# Patient Record
Sex: FEMALE
[De-identification: names, ages, dates, MRNs, and addresses within clinical notes are randomized per-mention and may not be internally consistent; named-entity substitution may affect disease eponyms.]

---

## 2023-03-21 LAB
ANTIBODY SCREEN: NORMAL
ERYTHROCYTE DISTRIBUTION WIDTH (RATIO) BY AUTOMATED COUNT: 14.7 % (ref 11.5–14.5)
ERYTHROCYTE MEAN CORPUSCULAR HEMOGLOBIN CONCENTRATION (G/DL) BY AUTOMATED: 31.4 G/DL (ref 32–36)
ERYTHROCYTE MEAN CORPUSCULAR VOLUME (FL) BY AUTOMATED COUNT: 85 FL (ref 80–100)
ERYTHROCYTES (10*6/UL) IN BLOOD BY AUTOMATED COUNT: 4.8 X10E12/L (ref 4–5.2)
HEMATOCRIT (%) IN BLOOD BY AUTOMATED COUNT: 40.7 % (ref 36–46)
HEMOGLOBIN (G/DL) IN BLOOD: 12.8 G/DL (ref 12–16)
LEUKOCYTES (10*3/UL) IN BLOOD BY AUTOMATED COUNT: 13.9 X10E9/L (ref 4.4–11.3)
NRBC (PER 100 WBCS) BY AUTOMATED COUNT: 0 /100 WBC (ref 0–0)
PLATELETS (10*3/UL) IN BLOOD AUTOMATED COUNT: 348 X10E9/L (ref 150–450)
REFLEX ADDED, ANEMIA PANEL: ABNORMAL
SYPHILIS TOTAL AB: NONREACTIVE

## 2023-03-22 LAB
CHLAMYDIA TRACH., AMPLIFIED: NEGATIVE
CLUE CELLS: NORMAL
N. GONORRHEA, AMPLIFIED: NEGATIVE
NUGENT SCORE: 1
YEAST: NORMAL

## 2023-04-21 LAB
ABO GROUP (TYPE) IN BLOOD: NORMAL
ALANINE AMINOTRANSFERASE (SGPT) (U/L) IN SER/PLAS: 12 U/L (ref 7–45)
ALBUMIN (G/DL) IN SER/PLAS: 3.4 G/DL (ref 3.4–5)
ALKALINE PHOSPHATASE (U/L) IN SER/PLAS: 92 U/L (ref 33–110)
ANION GAP IN SER/PLAS: 15 MMOL/L (ref 10–20)
ANTIBODY SCREEN: NORMAL
ASPARTATE AMINOTRANSFERASE (SGOT) (U/L) IN SER/PLAS: 14 U/L (ref 9–39)
BILIRUBIN TOTAL (MG/DL) IN SER/PLAS: 0.2 MG/DL (ref 0–1.2)
CALCIUM (MG/DL) IN SER/PLAS: 9.5 MG/DL (ref 8.6–10.6)
CARBON DIOXIDE, TOTAL (MMOL/L) IN SER/PLAS: 21 MMOL/L (ref 21–32)
CHLORIDE (MMOL/L) IN SER/PLAS: 106 MMOL/L (ref 98–107)
CREATININE (MG/DL) IN SER/PLAS: 0.68 MG/DL (ref 0.5–1.05)
ERYTHROCYTE DISTRIBUTION WIDTH (RATIO) BY AUTOMATED COUNT: 14.5 % (ref 11.5–14.5)
ERYTHROCYTE MEAN CORPUSCULAR HEMOGLOBIN CONCENTRATION (G/DL) BY AUTOMATED: 32.1 G/DL (ref 32–36)
ERYTHROCYTE MEAN CORPUSCULAR VOLUME (FL) BY AUTOMATED COUNT: 85 FL (ref 80–100)
ERYTHROCYTES (10*6/UL) IN BLOOD BY AUTOMATED COUNT: 4.21 X10E12/L (ref 4–5.2)
GFR FEMALE: >90 ML/MIN/1.73M2
GLUCOSE (MG/DL) IN SER/PLAS: 119 MG/DL (ref 74–99)
GLUCOSE, 1 HR SCREEN, PREG: 113 MG/DL
HEMATOCRIT (%) IN BLOOD BY AUTOMATED COUNT: 35.8 % (ref 36–46)
HEMOGLOBIN (G/DL) IN BLOOD: 11.5 G/DL (ref 12–16)
HEPATITIS B VIRUS SURFACE AG PRESENCE IN SERUM: NONREACTIVE
HEPATITIS C VIRUS AB PRESENCE IN SERUM: NONREACTIVE
HIV 1/ 2 AG/AB SCREEN: NONREACTIVE
LACTATE DEHYDROGENASE (U/L) IN SER/PLAS BY LAC->PYR RXN: 156 U/L (ref 84–246)
LEUKOCYTES (10*3/UL) IN BLOOD BY AUTOMATED COUNT: 12.5 X10E9/L (ref 4.4–11.3)
NRBC (PER 100 WBCS) BY AUTOMATED COUNT: 0 /100 WBC (ref 0–0)
PLATELETS (10*3/UL) IN BLOOD AUTOMATED COUNT: 339 X10E9/L (ref 150–450)
POTASSIUM (MMOL/L) IN SER/PLAS: 3.9 MMOL/L (ref 3.5–5.3)
PROTEIN TOTAL: 6.3 G/DL (ref 6.4–8.2)
REFLEX ADDED, ANEMIA PANEL: ABNORMAL
RH FACTOR: NORMAL
RUBELLA VIRUS IGG AB: POSITIVE
SODIUM (MMOL/L) IN SER/PLAS: 138 MMOL/L (ref 136–145)
SYPHILIS TOTAL AB: NONREACTIVE
URATE (MG/DL) IN SER/PLAS: 4.1 MG/DL (ref 2.3–6.7)
UREA NITROGEN (MG/DL) IN SER/PLAS: 11 MG/DL (ref 6–23)

## 2023-05-03 ENCOUNTER — NURSE TRIAGE (OUTPATIENT)
Dept: OTHER | Facility: CLINIC | Age: 28
End: 2023-05-03

## 2023-05-04 NOTE — TELEPHONE ENCOUNTER
Location of patient: [place]    Subjective: Caller states pregnant and had a bowel movement with white mixed in. States that she had to try harder today to have her BM. States fetus moves more at night time than it does during the day. Was seen by OB/GYN 04/21/23    Current Symptoms: white spot in stool    Onset: 1 episode      Pain Severity: 5/10; back and groin pain from pregnancy      Pregnant: Yes 30 weeks    Recommended disposition: Mando Parsons [id number] advice provided, patient verbalizes understanding; denies any other questions or concerns; instructed to call back for any new or worsening symptoms. Pt agrees to self monitor and call back if symptoms worsen    This triage is a result of a call to [address] [place] [address]. Please do not respond to the triage nurse through this encounter. Any subsequent communication should be directly with the patient.       Reason for Disposition   [1] Abnormal color is unexplained AND [2] present < 24 hours    Protocols used: Stools - Unusual Color-ADULT-

## 2023-06-28 LAB — GROUP B STREP SCREEN: NORMAL

## 2023-07-03 LAB
ABO GROUP (TYPE) IN BLOOD: NORMAL
ANTIBODY SCREEN: NORMAL
ERYTHROCYTE DISTRIBUTION WIDTH (RATIO) BY AUTOMATED COUNT: 15.1 % (ref 11.5–14.5)
ERYTHROCYTE MEAN CORPUSCULAR HEMOGLOBIN CONCENTRATION (G/DL) BY AUTOMATED: 31.5 G/DL (ref 32–36)
ERYTHROCYTE MEAN CORPUSCULAR VOLUME (FL) BY AUTOMATED COUNT: 83 FL (ref 80–100)
ERYTHROCYTES (10*6/UL) IN BLOOD BY AUTOMATED COUNT: 4.05 X10E12/L (ref 4–5.2)
HEMATOCRIT (%) IN BLOOD BY AUTOMATED COUNT: 33.7 % (ref 36–46)
HEMOGLOBIN (G/DL) IN BLOOD: 10.6 G/DL (ref 12–16)
LEUKOCYTES (10*3/UL) IN BLOOD BY AUTOMATED COUNT: 8.4 X10E9/L (ref 4.4–11.3)
NRBC (PER 100 WBCS) BY AUTOMATED COUNT: 0 /100 WBC (ref 0–0)
PLATELETS (10*3/UL) IN BLOOD AUTOMATED COUNT: 309 X10E9/L (ref 150–450)
RH FACTOR: NORMAL

## 2024-03-11 ENCOUNTER — HOSPITAL ENCOUNTER (EMERGENCY)
Facility: HOSPITAL | Age: 29
Discharge: HOME | End: 2024-03-12
Payer: COMMERCIAL

## 2024-03-11 VITALS
TEMPERATURE: 97.5 F | WEIGHT: 200 LBS | RESPIRATION RATE: 20 BRPM | HEIGHT: 59 IN | DIASTOLIC BLOOD PRESSURE: 77 MMHG | HEART RATE: 92 BPM | OXYGEN SATURATION: 99 % | SYSTOLIC BLOOD PRESSURE: 126 MMHG | BODY MASS INDEX: 40.32 KG/M2

## 2024-03-11 DIAGNOSIS — K64.9 HEMORRHOIDS, UNSPECIFIED HEMORRHOID TYPE: Primary | ICD-10-CM

## 2024-03-11 DIAGNOSIS — K92.2 GASTROINTESTINAL HEMORRHAGE, UNSPECIFIED GASTROINTESTINAL HEMORRHAGE TYPE: ICD-10-CM

## 2024-03-11 LAB
ALBUMIN SERPL BCP-MCNC: 4.1 G/DL (ref 3.4–5)
ALP SERPL-CCNC: 75 U/L (ref 33–110)
ALT SERPL W P-5'-P-CCNC: 20 U/L (ref 7–45)
ANION GAP SERPL CALC-SCNC: 13 MMOL/L (ref 10–20)
APTT PPP: 29 SECONDS (ref 27–38)
AST SERPL W P-5'-P-CCNC: 22 U/L (ref 9–39)
BILIRUB SERPL-MCNC: 0.2 MG/DL (ref 0–1.2)
BUN SERPL-MCNC: 12 MG/DL (ref 6–23)
CALCIUM SERPL-MCNC: 8.3 MG/DL (ref 8.6–10.3)
CHLORIDE SERPL-SCNC: 108 MMOL/L (ref 98–107)
CO2 SERPL-SCNC: 26 MMOL/L (ref 21–32)
CREAT SERPL-MCNC: 0.98 MG/DL (ref 0.5–1.05)
EGFRCR SERPLBLD CKD-EPI 2021: 80 ML/MIN/1.73M*2
GLUCOSE SERPL-MCNC: 109 MG/DL (ref 74–99)
HEMOCCULT SP1 STL QL: POSITIVE
INR PPP: 0.9 (ref 0.9–1.1)
POTASSIUM SERPL-SCNC: 3.9 MMOL/L (ref 3.5–5.3)
PROT SERPL-MCNC: 7.2 G/DL (ref 6.4–8.2)
PROTHROMBIN TIME: 10.6 SECONDS (ref 9.8–12.8)
SODIUM SERPL-SCNC: 143 MMOL/L (ref 136–145)

## 2024-03-11 PROCEDURE — 96374 THER/PROPH/DIAG INJ IV PUSH: CPT

## 2024-03-11 PROCEDURE — 82270 OCCULT BLOOD FECES: CPT | Performed by: NURSE PRACTITIONER

## 2024-03-11 PROCEDURE — 85610 PROTHROMBIN TIME: CPT | Performed by: NURSE PRACTITIONER

## 2024-03-11 PROCEDURE — 85025 COMPLETE CBC W/AUTO DIFF WBC: CPT | Performed by: NURSE PRACTITIONER

## 2024-03-11 PROCEDURE — 80053 COMPREHEN METABOLIC PANEL: CPT | Performed by: NURSE PRACTITIONER

## 2024-03-11 PROCEDURE — 36415 COLL VENOUS BLD VENIPUNCTURE: CPT | Performed by: NURSE PRACTITIONER

## 2024-03-11 PROCEDURE — 99284 EMERGENCY DEPT VISIT MOD MDM: CPT | Mod: 25

## 2024-03-11 RX ORDER — HYDROCORTISONE 25 MG/G
1 CREAM TOPICAL 2 TIMES DAILY
Qty: 6 G | Refills: 0 | Status: SHIPPED | OUTPATIENT
Start: 2024-03-11 | End: 2024-04-10

## 2024-03-11 RX ORDER — PANTOPRAZOLE SODIUM 40 MG/10ML
40 INJECTION, POWDER, LYOPHILIZED, FOR SOLUTION INTRAVENOUS ONCE
Status: COMPLETED | OUTPATIENT
Start: 2024-03-12 | End: 2024-03-11

## 2024-03-11 RX ORDER — OMEPRAZOLE 20 MG/1
20 CAPSULE, DELAYED RELEASE ORAL DAILY
Qty: 30 EACH | Refills: 0 | Status: SHIPPED | OUTPATIENT
Start: 2024-03-11 | End: 2024-04-10

## 2024-03-11 RX ADMIN — PANTOPRAZOLE SODIUM 40 MG: 40 INJECTION, POWDER, FOR SOLUTION INTRAVENOUS at 23:59

## 2024-03-11 ASSESSMENT — COLUMBIA-SUICIDE SEVERITY RATING SCALE - C-SSRS
1. IN THE PAST MONTH, HAVE YOU WISHED YOU WERE DEAD OR WISHED YOU COULD GO TO SLEEP AND NOT WAKE UP?: NO
6. HAVE YOU EVER DONE ANYTHING, STARTED TO DO ANYTHING, OR PREPARED TO DO ANYTHING TO END YOUR LIFE?: NO
2. HAVE YOU ACTUALLY HAD ANY THOUGHTS OF KILLING YOURSELF?: NO

## 2024-03-11 ASSESSMENT — PAIN SCALES - GENERAL: PAINLEVEL_OUTOF10: 0 - NO PAIN

## 2024-03-11 ASSESSMENT — PAIN - FUNCTIONAL ASSESSMENT: PAIN_FUNCTIONAL_ASSESSMENT: 0-10

## 2024-03-11 NOTE — Clinical Note
Savita Leon was seen and treated in our emergency department on 3/11/2024.  She may return to work on 03/12/2024.       If you have any questions or concerns, please don't hesitate to call.      Quan Wilson, APRN-CNP

## 2024-03-12 LAB
BASOPHILS # BLD AUTO: 0.03 X10*3/UL (ref 0–0.1)
BASOPHILS NFR BLD AUTO: 0.3 %
EOSINOPHIL # BLD AUTO: 0.25 X10*3/UL (ref 0–0.7)
EOSINOPHIL NFR BLD AUTO: 2.5 %
ERYTHROCYTE [DISTWIDTH] IN BLOOD BY AUTOMATED COUNT: 15.2 % (ref 11.5–14.5)
HCT VFR BLD AUTO: 38.5 % (ref 36–46)
HGB BLD-MCNC: 12.3 G/DL (ref 12–16)
IMM GRANULOCYTES # BLD AUTO: 0.04 X10*3/UL (ref 0–0.7)
IMM GRANULOCYTES NFR BLD AUTO: 0.4 % (ref 0–0.9)
LYMPHOCYTES # BLD AUTO: 2.89 X10*3/UL (ref 1.2–4.8)
LYMPHOCYTES NFR BLD AUTO: 28.6 %
MCH RBC QN AUTO: 26.3 PG (ref 26–34)
MCHC RBC AUTO-ENTMCNC: 31.9 G/DL (ref 32–36)
MCV RBC AUTO: 82 FL (ref 80–100)
MONOCYTES # BLD AUTO: 0.64 X10*3/UL (ref 0.1–1)
MONOCYTES NFR BLD AUTO: 6.3 %
NEUTROPHILS # BLD AUTO: 6.24 X10*3/UL (ref 1.2–7.7)
NEUTROPHILS NFR BLD AUTO: 61.9 %
NRBC BLD-RTO: 0 /100 WBCS (ref 0–0)
PLATELET # BLD AUTO: 382 X10*3/UL (ref 150–450)
RBC # BLD AUTO: 4.67 X10*6/UL (ref 4–5.2)
WBC # BLD AUTO: 10.1 X10*3/UL (ref 4.4–11.3)

## 2024-03-12 PROCEDURE — C9113 INJ PANTOPRAZOLE SODIUM, VIA: HCPCS | Performed by: NURSE PRACTITIONER

## 2024-03-12 PROCEDURE — 2500000004 HC RX 250 GENERAL PHARMACY W/ HCPCS (ALT 636 FOR OP/ED): Performed by: NURSE PRACTITIONER

## 2024-03-12 NOTE — ED PROVIDER NOTES
"HPI   Chief Complaint   Patient presents with    Black or Bloody Stool     Patient presents to ED from home for blood in stool that started Friday. Patient states that when she has a bowel movement there is bright red blood. Denies abdominal pain. Patient states she has had this happen before and she said it is from her \"straining\". Patient denies constipation.  in 2023.       This is a 28-year-old female presents today with concern for rectal bleeding.  She notes that when she wipes she has bright red blood when she uses the hard tissue paper at work.  She denies abdominal pain.  She denies weakness.  She denies chest pain.  All vital signs are normal and stable in triage.  She has no other cause for concern or complaint.  She denies fever or chills.  She denies any allergies to medication.  She is not on any anticoagulant medication.  She denies pregnancy.  Patient has been chronically anemic since she had her  in 2023.  On 2023 her hemoglobin hematocrit was 8.5 and 25.8.  On  it was 8.2 and 27.5.  Prior to the  on July 3 it was 10.6 and 33.7.      History provided by:  Patient   used: No                        [place] Coma Scale Score: 15                     Patient History   Past Medical History:   Diagnosis Date    Encounter for screening for infections with a predominantly sexual mode of transmission 2015    Screen for STD (sexually transmitted disease)    Hemorrhage of anus and rectum 10/27/2014    Bright red rectal bleeding    Personal history of other diseases of the female genital tract 10/27/2014    History of vaginal discharge    Personal history of other infectious and parasitic diseases 10/15/2014    History of trichomoniasis    Personal history of other specified conditions 2014    History of urinary frequency     No past surgical history on file.  No family history on file.  Social History     Tobacco Use    Smoking " status: Not on file    Smokeless tobacco: Not on file   Substance Use Topics    Alcohol use: Not on file    Drug use: Not on file       Physical Exam   ED Triage Vitals [03/11/24 2244]   Temperature Heart Rate Respirations BP   36.4 °C (97.5 °F) 92 20 126/77      Pulse Ox Temp Source Heart Rate Source Patient Position   99 % Temporal Monitor Sitting      BP Location FiO2 (%)     Right arm --       Physical Exam  Constitutional:       Appearance: She is obese.   HENT:      Head: Normocephalic and atraumatic.      Nose: Nose normal.      Mouth/Throat:      Mouth: Mucous membranes are dry.   Eyes:      Extraocular Movements: Extraocular movements intact.      Pupils: Pupils are equal, round, and reactive to light.   Cardiovascular:      Rate and Rhythm: Normal rate and regular rhythm.   Pulmonary:      Effort: Pulmonary effort is normal.      Breath sounds: Normal breath sounds.   Abdominal:      General: There is no distension.      Palpations: Abdomen is soft. There is no mass.      Tenderness: There is no abdominal tenderness. There is no right CVA tenderness, left CVA tenderness, guarding or rebound.      Hernia: No hernia is present.   Genitourinary:     Rectum: Guaiac result negative.      Comments: Appreciated external hemorrhoids 1 and 1 internal hemorrhoid  Musculoskeletal:         General: Normal range of motion.      Cervical back: Normal range of motion and neck supple.   Skin:     General: Skin is warm.      Capillary Refill: Capillary refill takes less than 2 seconds.   Neurological:      General: No focal deficit present.      Mental Status: She is alert and oriented to person, place, and time.   Psychiatric:         Mood and Affect: Mood normal.         Behavior: Behavior normal.         ED Course & MDM   Diagnoses as of 03/12/24 0006   Hemorrhoids, unspecified hemorrhoid type   Gastrointestinal hemorrhage, unspecified gastrointestinal hemorrhage type       Medical Decision Making  Patient was diagnosed  with hemorrhoids.  Her conjunctiva was bright red.  Her vital signs were stable.  Her abdomen was soft and not peritonitic.  Normal bowel sounds.  No pain elicited in any quadrant of her abdomen.  CBC CMP and coags ordered to confirm stability of her hemoglobin hematocrit normal kidney function and liver function panel.  Patient's metabolic panel was far better than the metabolic panel in July 2023.  And essentially normal.  The occult blood was positive and I did give patient 40 mg IV Protonix but I believe the positive occult blood was due to the hemorrhoids I appreciated during rectal exam.  I requested a GI appointment with on-call GI Dr. Lovelace, and patient received contact information.  Coags were normal.  CBC had a hemoglobin and hematocrit that was far better than the hemoglobin hematocrit on July 7 and July 8.  Instead of 8.5 and 25.8 it was 12 and 38.  This lessened my concern for acute GI bleed.  Omeprazole was sent to patient's pharmacy along with Anusol for hemorrhoids.  She understands the importance of following up with GI and she also understands careful return precautions.  There was no pain elicited to any quadrant of the abdomen and she was safely discharged home    Amount and/or Complexity of Data Reviewed  Labs: ordered.        Procedure  Procedures     Quan Wilson, APRN-CNP  03/12/24 0007

## 2024-11-05 ENCOUNTER — APPOINTMENT (OUTPATIENT)
Dept: RADIOLOGY | Facility: HOSPITAL | Age: 29
End: 2024-11-05
Payer: COMMERCIAL

## 2024-11-05 ENCOUNTER — OFFICE VISIT (OUTPATIENT)
Dept: URGENT CARE | Age: 29
End: 2024-11-05
Payer: COMMERCIAL

## 2024-11-05 ENCOUNTER — APPOINTMENT (OUTPATIENT)
Dept: CARDIOLOGY | Facility: HOSPITAL | Age: 29
End: 2024-11-05
Payer: COMMERCIAL

## 2024-11-05 ENCOUNTER — HOSPITAL ENCOUNTER (EMERGENCY)
Facility: HOSPITAL | Age: 29
Discharge: HOME | End: 2024-11-05
Attending: STUDENT IN AN ORGANIZED HEALTH CARE EDUCATION/TRAINING PROGRAM
Payer: COMMERCIAL

## 2024-11-05 VITALS
HEART RATE: 79 BPM | RESPIRATION RATE: 17 BRPM | TEMPERATURE: 98.3 F | SYSTOLIC BLOOD PRESSURE: 143 MMHG | DIASTOLIC BLOOD PRESSURE: 105 MMHG | OXYGEN SATURATION: 98 %

## 2024-11-05 VITALS
WEIGHT: 220 LBS | SYSTOLIC BLOOD PRESSURE: 128 MMHG | HEIGHT: 58 IN | TEMPERATURE: 97.9 F | RESPIRATION RATE: 16 BRPM | BODY MASS INDEX: 46.18 KG/M2 | DIASTOLIC BLOOD PRESSURE: 74 MMHG | HEART RATE: 84 BPM | OXYGEN SATURATION: 100 %

## 2024-11-05 DIAGNOSIS — R07.9 CHEST PAIN, UNSPECIFIED TYPE: Primary | ICD-10-CM

## 2024-11-05 LAB
ANION GAP SERPL CALC-SCNC: 11 MMOL/L (ref 10–20)
B-HCG SERPL-ACNC: <2 MIU/ML
BASE EXCESS BLDV CALC-SCNC: -0.2 MMOL/L (ref -2–3)
BASOPHILS # BLD AUTO: 0.04 X10*3/UL (ref 0–0.1)
BASOPHILS NFR BLD AUTO: 0.4 %
BODY TEMPERATURE: 37 DEGREES CELSIUS
BUN SERPL-MCNC: 9 MG/DL (ref 6–23)
CALCIUM SERPL-MCNC: 8.8 MG/DL (ref 8.6–10.3)
CARDIAC TROPONIN I PNL SERPL HS: 5 NG/L (ref 0–13)
CARDIAC TROPONIN I PNL SERPL HS: 5 NG/L (ref 0–13)
CHLORIDE SERPL-SCNC: 105 MMOL/L (ref 98–107)
CO2 SERPL-SCNC: 26 MMOL/L (ref 21–32)
CREAT SERPL-MCNC: 0.84 MG/DL (ref 0.5–1.05)
EGFRCR SERPLBLD CKD-EPI 2021: >90 ML/MIN/1.73M*2
EOSINOPHIL # BLD AUTO: 0.07 X10*3/UL (ref 0–0.7)
EOSINOPHIL NFR BLD AUTO: 0.7 %
ERYTHROCYTE [DISTWIDTH] IN BLOOD BY AUTOMATED COUNT: 14.3 % (ref 11.5–14.5)
GLUCOSE SERPL-MCNC: 99 MG/DL (ref 74–99)
HCO3 BLDV-SCNC: 25.9 MMOL/L (ref 22–26)
HCT VFR BLD AUTO: 44.3 % (ref 36–46)
HGB BLD-MCNC: 14 G/DL (ref 12–16)
IMM GRANULOCYTES # BLD AUTO: 0.03 X10*3/UL (ref 0–0.7)
IMM GRANULOCYTES NFR BLD AUTO: 0.3 % (ref 0–0.9)
INHALED O2 CONCENTRATION: 21 %
LYMPHOCYTES # BLD AUTO: 2.08 X10*3/UL (ref 1.2–4.8)
LYMPHOCYTES NFR BLD AUTO: 21.3 %
MAGNESIUM SERPL-MCNC: 2.04 MG/DL (ref 1.6–2.4)
MCH RBC QN AUTO: 25.8 PG (ref 26–34)
MCHC RBC AUTO-ENTMCNC: 31.6 G/DL (ref 32–36)
MCV RBC AUTO: 82 FL (ref 80–100)
MONOCYTES # BLD AUTO: 0.56 X10*3/UL (ref 0.1–1)
MONOCYTES NFR BLD AUTO: 5.7 %
NEUTROPHILS # BLD AUTO: 6.99 X10*3/UL (ref 1.2–7.7)
NEUTROPHILS NFR BLD AUTO: 71.6 %
NRBC BLD-RTO: 0 /100 WBCS (ref 0–0)
OXYHGB MFR BLDV: 60.6 % (ref 45–75)
PCO2 BLDV: 47 MM HG (ref 41–51)
PH BLDV: 7.35 PH (ref 7.33–7.43)
PLATELET # BLD AUTO: 422 X10*3/UL (ref 150–450)
PO2 BLDV: 35 MM HG (ref 35–45)
POTASSIUM SERPL-SCNC: 3.9 MMOL/L (ref 3.5–5.3)
RBC # BLD AUTO: 5.42 X10*6/UL (ref 4–5.2)
SAO2 % BLDV: 62 % (ref 45–75)
SODIUM SERPL-SCNC: 138 MMOL/L (ref 136–145)
WBC # BLD AUTO: 9.8 X10*3/UL (ref 4.4–11.3)

## 2024-11-05 PROCEDURE — 84702 CHORIONIC GONADOTROPIN TEST: CPT | Performed by: STUDENT IN AN ORGANIZED HEALTH CARE EDUCATION/TRAINING PROGRAM

## 2024-11-05 PROCEDURE — 83735 ASSAY OF MAGNESIUM: CPT | Performed by: STUDENT IN AN ORGANIZED HEALTH CARE EDUCATION/TRAINING PROGRAM

## 2024-11-05 PROCEDURE — 71046 X-RAY EXAM CHEST 2 VIEWS: CPT | Performed by: RADIOLOGY

## 2024-11-05 PROCEDURE — 85025 COMPLETE CBC W/AUTO DIFF WBC: CPT | Performed by: STUDENT IN AN ORGANIZED HEALTH CARE EDUCATION/TRAINING PROGRAM

## 2024-11-05 PROCEDURE — 36415 COLL VENOUS BLD VENIPUNCTURE: CPT | Performed by: STUDENT IN AN ORGANIZED HEALTH CARE EDUCATION/TRAINING PROGRAM

## 2024-11-05 PROCEDURE — 71046 X-RAY EXAM CHEST 2 VIEWS: CPT

## 2024-11-05 PROCEDURE — 82805 BLOOD GASES W/O2 SATURATION: CPT | Performed by: STUDENT IN AN ORGANIZED HEALTH CARE EDUCATION/TRAINING PROGRAM

## 2024-11-05 PROCEDURE — 93005 ELECTROCARDIOGRAM TRACING: CPT

## 2024-11-05 PROCEDURE — 84484 ASSAY OF TROPONIN QUANT: CPT | Performed by: STUDENT IN AN ORGANIZED HEALTH CARE EDUCATION/TRAINING PROGRAM

## 2024-11-05 PROCEDURE — 80048 BASIC METABOLIC PNL TOTAL CA: CPT | Performed by: STUDENT IN AN ORGANIZED HEALTH CARE EDUCATION/TRAINING PROGRAM

## 2024-11-05 PROCEDURE — 99284 EMERGENCY DEPT VISIT MOD MDM: CPT

## 2024-11-05 RX ORDER — NAPROXEN SODIUM 220 MG/1
162 TABLET, FILM COATED ORAL ONCE
Status: COMPLETED | OUTPATIENT
Start: 2024-11-05 | End: 2024-11-05

## 2024-11-05 ASSESSMENT — PAIN DESCRIPTION - LOCATION: LOCATION: CHEST

## 2024-11-05 ASSESSMENT — COLUMBIA-SUICIDE SEVERITY RATING SCALE - C-SSRS
2. HAVE YOU ACTUALLY HAD ANY THOUGHTS OF KILLING YOURSELF?: NO
6. HAVE YOU EVER DONE ANYTHING, STARTED TO DO ANYTHING, OR PREPARED TO DO ANYTHING TO END YOUR LIFE?: NO
1. IN THE PAST MONTH, HAVE YOU WISHED YOU WERE DEAD OR WISHED YOU COULD GO TO SLEEP AND NOT WAKE UP?: NO

## 2024-11-05 ASSESSMENT — PAIN SCALES - GENERAL
PAINLEVEL_OUTOF10: 0 - NO PAIN
PAINLEVEL_OUTOF10: 5 - MODERATE PAIN
PAINLEVEL_OUTOF10: 6
PAINLEVEL_OUTOF10: 6

## 2024-11-05 ASSESSMENT — PAIN - FUNCTIONAL ASSESSMENT: PAIN_FUNCTIONAL_ASSESSMENT: 0-10

## 2024-11-05 ASSESSMENT — ENCOUNTER SYMPTOMS: BACK PAIN: 1

## 2024-11-05 NOTE — DISCHARGE INSTRUCTIONS
You are seen in the emergency department for chest tightness and pain.  We discussed that your workup here in the emergency department was very reassuring.  We have low concern for any emergent cause your symptoms.  We do advise that you follow-up with a primary care physician, we have made a referral for you.  If you develop any severe shortness of breath that feels similar to your previous asthma exacerbations, if you have any new or worsening chest pain, if you have any nausea or vomiting preventing from eating or drinking or lightheaded or dizziness please return to the emergency department.  Otherwise you may follow-up with your primary care doctor, they will reach out to you to help you schedule an appointment.

## 2024-11-05 NOTE — PROGRESS NOTES
"Subjective   Patient ID: Savita Leon is a 29 y.o. female. They present today with a chief complaint of Back Pain and Chest Pain (Pt c/o back pain/tightness; chest pain/tightness. All pain/tightness located above bra strap states pt. Causes difficulty with walking. Took Raysa Aspirin this morning for the pain. Pt states this has happened 2x before. Pt without PCP. EDILSON, RN).    History of Present Illness    Back Pain  Associated symptoms include chest pain.   Chest Pain  Associated symptoms: back pain       29-year-old female with history of hypertension, currently not on any medications as \"it has been controlled\", woke up at 4 AM with bilateral upper back pain.  This morning while getting ready for work, started having epigastric pain that has progressed to a tightness spreading on both sides of the chest and through to the back.  Associated symptoms are having shortness of breath/hard to breathe, diaphoresis, lightheadedness, nausea and vomiting.  Some increase in pain with deep inspiration.  She denies heart palpitation.  She denies known pregnancy.  No recent travel.  No injury.  No known fever, no acute cough or cold symptoms.    With epigastric pain, it initially felt like hunger pains so she took some food but it did not help.  She then decided to swallow to aspirin.  She did not chew and swallow.  It took a while but eventually it seemed to have improved her pain.      Past Medical History  Allergies as of 11/05/2024    (No Known Allergies)       (Not in a hospital admission)       Past Medical History:   Diagnosis Date    Encounter for screening for infections with a predominantly sexual mode of transmission 06/24/2015    Screen for STD (sexually transmitted disease)    Hemorrhage of anus and rectum 10/27/2014    Bright red rectal bleeding    Personal history of other diseases of the female genital tract 10/27/2014    History of vaginal discharge    Personal history of other infectious and parasitic " diseases 10/15/2014    History of trichomoniasis    Personal history of other specified conditions 09/22/2014    History of urinary frequency       No past surgical history on file.    Alcohol use questions deferred to the physician. Drug use questions deferred to the physician.    Review of Systems  Review of Systems   Cardiovascular:  Positive for chest pain.   Musculoskeletal:  Positive for back pain.                                  Objective    Vitals:    11/05/24 1120   BP: (!) 143/105   BP Location: Left arm   Patient Position: Sitting   BP Cuff Size: Large adult   Pulse: 79   Resp: 17   Temp: 36.8 °C (98.3 °F)   TempSrc: Oral   SpO2: 98%     No LMP recorded.    Physical Exam    Constitutional: Vital signs reviewed. Well developed and well nourished. Patient alert and without distress.  High BMI.    Head and Face: No swelling, atraumatic.    Eyes: Pupils and irises normal. Pink conjunctivae, anicteric sclerae. No conjunctival injection.    Neck: No neck mass observed. Supple.    Cardiovascular: Heart rate normal, normal S1 and S2, no gallops, no murmurs and no pericardial rub. Rhythm: Normal. No peripheral edema.    Pulmonary: No respiratory distress. Clear breath sounds.    Skin/musculoskeletal: No chest wall tenderness, no thoracic spine and paraspinal tenderness.  No trunk lesions noted.    Abdomen: Rotund abdomen, normal bowel sounds, nondistended, soft.  Tender in the upper half of the abdomen without rebound or guarding.  Positive Pace's test.    Neurologic: Cortical function: Normal    Lymphatic: No cervical lymphadenopathy      ECG 12 Lead    Date/Time: 11/5/2024 11:33 AM    Performed by: Janeen Galdamez  Authorized by: Janeen Galdamez    ECG interpreted by ED Physician in the absence of a cardiologist: yes    Previous ECG:     Previous ECG:  Unavailable  Interpretation:     Interpretation: abnormal      Details:  NSR  78bpm, tw inversion inferior and anteorseptal  leads.      Point of Care Test & Imaging Results from this visit  No results found for this visit on 11/05/24.   No results found.    Diagnostic study results (if any) were reviewed by Janeen Galdamez.    Assessment/Plan   Allergies, medications, history, and pertinent labs/EKGs/Imaging reviewed by Janeen Galdamez.     Medical Decision Making  29-year-old female with acute onset of upper back, chest pain and upper abdominal pain with EKG suggesting ischemia.  911 called -patient agrees.  She took 2 aspirins today.  Gave her 2 baby aspirin to chew and swallow here.  I spoke with BRETT Alvarez at [facility] ED.    Orders and Diagnoses  Diagnoses and all orders for this visit:  Chest pain, unspecified type  -     ECG 12 Lead  -     aspirin chewable tablet 162 mg      Medical Admin Record      Patient disposition: ED    Electronically signed by Janeen Galdamez  11:44 AM

## 2024-11-05 NOTE — ED PROVIDER NOTES
"[facility] ED Note    Date of Service: 11/5/2024  Reason for Visit: Chest Pain      Patient History     HPI  Savita Leon is a 29 y.o. female with a past medical history notable for asthma who presents the emergency department for chest tightness.  Patient states that she woke up this morning had some chest tightness thereafter around her back from the front of her chest.  Patient states that she went to work the chest tightness of hemoglobin more severe and therefore she presented the ED.  She states she was given aspirin en route via EMS with some improvement of her symptoms.  Patient states that this chest tightness feels little bit different than her normal asthma exacerbations given that her asthma exacerbations are more related to shortness of breath rather than chest tightness.  She denies any significant shortness of breath, she denies any abdominal pain, nausea or vomiting or lower extremity edema.  She has no history of blood clots, no recent surgeries and is not on any oral contraception.      Past Medical History:   Diagnosis Date    Encounter for screening for infections with a predominantly sexual mode of transmission 06/24/2015    Screen for STD (sexually transmitted disease)    Hemorrhage of anus and rectum 10/27/2014    Bright red rectal bleeding    Personal history of other diseases of the female genital tract 10/27/2014    History of vaginal discharge    Personal history of other infectious and parasitic diseases 10/15/2014    History of trichomoniasis    Personal history of other specified conditions 09/22/2014    History of urinary frequency     No past surgical history on file.      Physical Exam     Vitals:    11/05/24 1240 11/05/24 1411 11/05/24 1432 11/05/24 1527   BP: (!) 149/97 (!) 151/95 (!) 136/95 (!) 132/96   Pulse: 84 76 81 66   Resp: 16 16 14 16   Temp: 36.6 °C (97.9 °F)      SpO2: 100% 100% 100% 100%   Weight: 99.8 kg (220 lb)      Height: 1.473 m (4' 10\")        General: " Age-appropriate female, nontoxic, sitting comfortably in the gurney no acute distress.  Pulmonary:   Non-labored breathing. Breath sounds clear bilaterally  Cardiac:  Regular rate   Abdomen:  Soft. Non-tender. Non-distended  Musculoskeletal:  No peripheral edema   Skin:  Dry, no rashes  Neuro: Alert and oriented.  Moves all 4 extremity spontaneously.    Diagnostic Studies     Labs:  Please see EMR for labs obtained during this patient encounter.    Radiology:  Please see EMR for imaging obtained during this patient encounter.    EKG:  Normal sinus rhythm, ventricular to 74.  Normal axis.  Normal NJ interval of 142.  Normal ventricular conduction with a QRS duration of 64 and QTc interval 399.  No Q waves appreciated, normal ST segments, diffuse T wave flattening.  No prior EKG available for comparison.    ED Course and MDM     Savita Leon is a 29 y.o. female with a history and presentation as described above in HPI.      Upon presentation, the patient was afebrile, well-appearing, with unremarkable vital signs.  Patient presented to the emergency department with chest tightness x 1 day.  Differential diagnosis includes ACS, possible asthma exacerbation, possible pneumonia or pulmonary embolism.  Patient met PERC criteria, with low suspicion for pulmonary embolism, therefore no further workup was pursued.  Low suspicion for ACS or primary cardiac component given patient's age, lack of historical risk factors, description of the pain without any exacerbating factors, and EKG without any signs of ischemia or arrhythmia and negative troponins.  Patient's lab work was largely unremarkable.  She has no signs suggest asthma exacerbation given absence of wheezing on exam, nonlabored breathing, without any signs of retention on VBG.  Patient's chest x-ray shows no acute cardiopulmonary abnormality.  At this time, patient appears well and is appropriate for discharge.  She was given a referral to primary care, and was  provided return precautions      Impression     Diagnoses as of 11/05/24 1629   Chest pain, unspecified type        Plan     Discharge, please see discharge instructions    Cristobal Puente MD  [facility] Emergency Medicine         Cristobal Puente MD  11/05/24 9999

## 2024-11-05 NOTE — ED TRIAGE NOTES
To ed from urgent care for evaluation of chest pain, per pt when it comes she becomes SON and has pain that radiated into her back

## 2024-11-05 NOTE — PATIENT INSTRUCTIONS
29-year-old female with acute onset of upper back, chest pain and upper abdominal pain with EKG suggesting ischemia.  911 called -patient agrees.  She took 2 aspirins today.  Gave her 2 baby aspirin to chew and swallow here.  I will call Susan ED RN to notify.

## 2024-11-05 NOTE — ED NOTES
Pt d/c'd. Reports being pain free.  VSS. Agreeable to plan of care      Amelie Barry RN  11/05/24 0464

## 2024-11-07 ENCOUNTER — OFFICE VISIT (OUTPATIENT)
Dept: PRIMARY CARE | Facility: CLINIC | Age: 29
End: 2024-11-07
Payer: COMMERCIAL

## 2024-11-07 VITALS
RESPIRATION RATE: 17 BRPM | HEIGHT: 58 IN | TEMPERATURE: 97.2 F | BODY MASS INDEX: 41.98 KG/M2 | HEART RATE: 75 BPM | OXYGEN SATURATION: 97 % | SYSTOLIC BLOOD PRESSURE: 133 MMHG | DIASTOLIC BLOOD PRESSURE: 87 MMHG | WEIGHT: 200 LBS

## 2024-11-07 DIAGNOSIS — Z00.00 ROUTINE GENERAL MEDICAL EXAMINATION AT A HEALTH CARE FACILITY: Primary | ICD-10-CM

## 2024-11-07 DIAGNOSIS — J45.20 MILD INTERMITTENT ASTHMA, UNSPECIFIED WHETHER COMPLICATED (HHS-HCC): ICD-10-CM

## 2024-11-07 DIAGNOSIS — E66.01 CLASS 3 SEVERE OBESITY DUE TO EXCESS CALORIES WITHOUT SERIOUS COMORBIDITY WITH BODY MASS INDEX (BMI) OF 40.0 TO 44.9 IN ADULT: ICD-10-CM

## 2024-11-07 DIAGNOSIS — I10 HYPERTENSION, UNSPECIFIED TYPE: ICD-10-CM

## 2024-11-07 DIAGNOSIS — R07.9 CHEST PAIN, UNSPECIFIED TYPE: ICD-10-CM

## 2024-11-07 DIAGNOSIS — E55.9 VITAMIN D DEFICIENCY: ICD-10-CM

## 2024-11-07 DIAGNOSIS — E66.813 CLASS 3 SEVERE OBESITY DUE TO EXCESS CALORIES WITHOUT SERIOUS COMORBIDITY WITH BODY MASS INDEX (BMI) OF 40.0 TO 44.9 IN ADULT: ICD-10-CM

## 2024-11-07 PROBLEM — E66.9 OBESITY: Status: ACTIVE | Noted: 2024-11-07

## 2024-11-07 PROBLEM — N76.0 BACTERIAL VAGINOSIS: Status: RESOLVED | Noted: 2024-11-07 | Resolved: 2024-11-07

## 2024-11-07 PROBLEM — B96.89 BACTERIAL VAGINOSIS: Status: RESOLVED | Noted: 2024-11-07 | Resolved: 2024-11-07

## 2024-11-07 PROBLEM — K43.9 VENTRAL HERNIA: Status: ACTIVE | Noted: 2024-11-07

## 2024-11-07 PROBLEM — K21.9 GERD (GASTROESOPHAGEAL REFLUX DISEASE): Status: ACTIVE | Noted: 2024-11-07

## 2024-11-07 PROCEDURE — 3079F DIAST BP 80-89 MM HG: CPT | Performed by: INTERNAL MEDICINE

## 2024-11-07 PROCEDURE — 99213 OFFICE O/P EST LOW 20 MIN: CPT | Performed by: INTERNAL MEDICINE

## 2024-11-07 PROCEDURE — 3008F BODY MASS INDEX DOCD: CPT | Performed by: INTERNAL MEDICINE

## 2024-11-07 PROCEDURE — 3075F SYST BP GE 130 - 139MM HG: CPT | Performed by: INTERNAL MEDICINE

## 2024-11-07 PROCEDURE — 1036F TOBACCO NON-USER: CPT | Performed by: INTERNAL MEDICINE

## 2024-11-07 RX ORDER — ALBUTEROL SULFATE 90 UG/1
2 INHALANT RESPIRATORY (INHALATION) EVERY 4 HOURS PRN
Qty: 8 G | Refills: 5 | Status: SHIPPED | OUTPATIENT
Start: 2024-11-07 | End: 2025-11-07

## 2024-11-07 ASSESSMENT — ENCOUNTER SYMPTOMS
FREQUENCY: 0
WHEEZING: 0
EYE PAIN: 0
VOMITING: 0
SLEEP DISTURBANCE: 0
TREMORS: 0
FLANK PAIN: 0
WOUND: 0
CONSTIPATION: 0
NUMBNESS: 0
HEMATURIA: 0
NAUSEA: 0
CONFUSION: 0
JOINT SWELLING: 0
DIARRHEA: 0
SEIZURES: 0
APPETITE CHANGE: 0
BLOOD IN STOOL: 0
SORE THROAT: 0
CHILLS: 0
TROUBLE SWALLOWING: 0
BACK PAIN: 0
SHORTNESS OF BREATH: 0
EYE DISCHARGE: 0
DIZZINESS: 0
HEADACHES: 0
DYSURIA: 0
FEVER: 0
WEAKNESS: 0
UNEXPECTED WEIGHT CHANGE: 0
ABDOMINAL PAIN: 0
NERVOUS/ANXIOUS: 0
COUGH: 0
PALPITATIONS: 0

## 2024-11-07 ASSESSMENT — PATIENT HEALTH QUESTIONNAIRE - PHQ9
SUM OF ALL RESPONSES TO PHQ9 QUESTIONS 1 AND 2: 0
1. LITTLE INTEREST OR PLEASURE IN DOING THINGS: NOT AT ALL
2. FEELING DOWN, DEPRESSED OR HOPELESS: NOT AT ALL

## 2024-11-07 ASSESSMENT — PAIN SCALES - GENERAL: PAINLEVEL_OUTOF10: 0-NO PAIN

## 2024-11-07 NOTE — PROGRESS NOTES
"Subjective   Patient ID: Savita Leon is a 29 y.o. female who presents for New Patient Visit.    HPI   NEW PT ( no PCP)  Here to establish . This is an ER referral.  Chart reviewed, PMHX, meds, Social HX- updated at visit   Labs( 11/5/24) and imaging reviewed.  Ob-gyn-yes with her pregnancy , but had no f/u since ( told her no referral needed)       ER 11/5/24 - went for CP , CXR, EKG , labs - WNL/or nonspecific changes     Declined flu vax.    Review of Systems   Constitutional:  Negative for appetite change, chills, fever and unexpected weight change.   HENT:  Negative for congestion, ear pain, sneezing, sore throat and trouble swallowing.    Eyes:  Negative for pain, discharge and visual disturbance.   Respiratory:  Negative for cough, shortness of breath and wheezing.    Cardiovascular:  Negative for chest pain, palpitations and leg swelling.   Gastrointestinal:  Negative for abdominal pain, blood in stool, constipation, diarrhea, nausea and vomiting.   Genitourinary:  Negative for dysuria, flank pain, frequency, hematuria and urgency.   Musculoskeletal:  Negative for back pain, gait problem and joint swelling.   Skin:  Negative for rash and wound.   Neurological:  Negative for dizziness, tremors, seizures, syncope, weakness, numbness and headaches.   Psychiatric/Behavioral:  Negative for confusion, sleep disturbance and suicidal ideas. The patient is not nervous/anxious.        Objective   /87 (BP Location: Left arm, Patient Position: Sitting, BP Cuff Size: Adult)   Pulse 75   Temp 36.2 °C (97.2 °F) (Temporal)   Resp 17   Ht 1.473 m (4' 10\")   Wt 90.7 kg (200 lb)   LMP 10/23/2024 (Approximate)   SpO2 97%   BMI 41.80 kg/m²   Patient Health Questionnaire-2 Score: 0      Physical Exam  Vitals and nursing note reviewed.   Constitutional:       General: She is not in acute distress.     Appearance: Normal appearance. She is obese.   HENT:      Head: Normocephalic and atraumatic.      Right Ear: " Tympanic membrane and ear canal normal.      Left Ear: Tympanic membrane and ear canal normal.      Nose: Nose normal.      Mouth/Throat:      Mouth: Mucous membranes are moist.      Pharynx: Oropharynx is clear.   Eyes:      Extraocular Movements: Extraocular movements intact.      Conjunctiva/sclera: Conjunctivae normal.      Pupils: Pupils are equal, round, and reactive to light.   Cardiovascular:      Rate and Rhythm: Normal rate and regular rhythm.      Heart sounds: No murmur heard.  Pulmonary:      Effort: Pulmonary effort is normal. No respiratory distress.      Breath sounds: Normal breath sounds. No wheezing or rhonchi.   Abdominal:      General: Abdomen is protuberant. Bowel sounds are normal.      Palpations: Abdomen is soft.      Tenderness: There is no abdominal tenderness.   Musculoskeletal:         General: Normal range of motion.      Cervical back: Neck supple.      Right lower leg: No edema.      Left lower leg: No edema.   Skin:     General: Skin is warm and dry.      Findings: No bruising or rash.   Neurological:      General: No focal deficit present.      Mental Status: She is alert and oriented to person, place, and time.      Motor: No weakness.      Gait: Gait normal.   Psychiatric:         Mood and Affect: Mood normal.         Behavior: Behavior normal.       Assessment/Plan   Assessment & Plan  Routine general medical examination at a health care facility  - Counseled continued efforts on healthy lifestyle modification including balanced diet, and continued exercise for >5 minutes  - counseled age appropriate vaccines and preventative measures        Chest pain, unspecified type  - acute and resolved, atypical CP as occured at rest w/o any precipitating factors   - advised pt it can be related to chest wall, GERD, hernia - less likely cardiac in nature ( given age and risk factors)  Orders:    Referral to Primary Care    Hypertension, unspecified type  - diet controlled  - used to be on  ARB , but Off Losartan ( never refilled after 2022 per chart review)       Mild intermittent asthma, unspecified whether complicated ([facility])  - not really an issue but good to have a rescue INH at hand   Orders:    albuterol (Ventolin HFA) 90 mcg/actuation inhaler; Inhale 2 puffs every 4 hours if needed for wheezing or shortness of breath.    Vitamin D deficiency  - currently takes no MVI   - Start OTC daily vit D 1000 - 2000 units- advised        Class 3 severe obesity due to excess calories without serious comorbidity with body mass index (BMI) of 40.0 to 44.9 in adult  - counseled healthy diet and weight loss as above

## 2024-11-07 NOTE — ASSESSMENT & PLAN NOTE
- not really an issue but good to have a rescue INH at hand   Orders:    albuterol (Ventolin HFA) 90 mcg/actuation inhaler; Inhale 2 puffs every 4 hours if needed for wheezing or shortness of breath.

## 2024-11-07 NOTE — ASSESSMENT & PLAN NOTE
- diet controlled  - used to be on ARB , but Off Losartan ( never refilled after 2022 per chart review)

## 2024-11-09 LAB
ATRIAL RATE: 74 BPM
P AXIS: 30 DEGREES
P OFFSET: 200 MS
P ONSET: 155 MS
PR INTERVAL: 142 MS
Q ONSET: 226 MS
QRS COUNT: 12 BEATS
QRS DURATION: 64 MS
QT INTERVAL: 360 MS
QTC CALCULATION(BAZETT): 399 MS
QTC FREDERICIA: 386 MS
R AXIS: 61 DEGREES
T AXIS: -30 DEGREES
T OFFSET: 406 MS
VENTRICULAR RATE: 74 BPM